# Patient Record
Sex: MALE | Race: WHITE
[De-identification: names, ages, dates, MRNs, and addresses within clinical notes are randomized per-mention and may not be internally consistent; named-entity substitution may affect disease eponyms.]

---

## 2019-12-02 ENCOUNTER — HOSPITAL ENCOUNTER (OUTPATIENT)
Dept: HOSPITAL 25 - ED | Age: 64
Discharge: HOME | End: 2019-12-02
Attending: UROLOGY
Payer: COMMERCIAL

## 2019-12-02 VITALS — SYSTOLIC BLOOD PRESSURE: 138 MMHG | DIASTOLIC BLOOD PRESSURE: 85 MMHG

## 2019-12-02 DIAGNOSIS — Z87.442: ICD-10-CM

## 2019-12-02 DIAGNOSIS — E11.9: ICD-10-CM

## 2019-12-02 DIAGNOSIS — Z79.84: ICD-10-CM

## 2019-12-02 DIAGNOSIS — R10.31: ICD-10-CM

## 2019-12-02 DIAGNOSIS — N13.2: Primary | ICD-10-CM

## 2019-12-02 LAB
ALBUMIN SERPL BCG-MCNC: 4.1 G/DL (ref 3.2–5.2)
ALBUMIN/GLOB SERPL: 1.2 {RATIO} (ref 1–3)
ALP SERPL-CCNC: 75 U/L (ref 34–104)
ALT SERPL W P-5'-P-CCNC: 24 U/L (ref 7–52)
ANION GAP SERPL CALC-SCNC: 10 MMOL/L (ref 2–11)
AST SERPL-CCNC: 21 U/L (ref 13–39)
BASOPHILS # BLD AUTO: 0.1 10^3/UL (ref 0–0.2)
BUN SERPL-MCNC: 26 MG/DL (ref 6–24)
BUN/CREAT SERPL: 19.4 (ref 8–20)
CALCIUM SERPL-MCNC: 9.9 MG/DL (ref 8.6–10.3)
CHLORIDE SERPL-SCNC: 102 MMOL/L (ref 101–111)
EOSINOPHIL # BLD AUTO: 0.1 10^3/UL (ref 0–0.6)
GLOBULIN SER CALC-MCNC: 3.3 G/DL (ref 2–4)
GLUCOSE SERPL-MCNC: 158 MG/DL (ref 70–100)
HCO3 SERPL-SCNC: 21 MMOL/L (ref 22–32)
HCT VFR BLD AUTO: 45 % (ref 42–52)
HGB BLD-MCNC: 15.1 G/DL (ref 14–18)
LYMPHOCYTES # BLD AUTO: 1.5 10^3/UL (ref 1–4.8)
MAGNESIUM SERPL-MCNC: 1.7 MG/DL (ref 1.9–2.7)
MCH RBC QN AUTO: 29 PG (ref 27–31)
MCHC RBC AUTO-ENTMCNC: 34 G/DL (ref 31–36)
MCV RBC AUTO: 87 FL (ref 80–94)
MONOCYTES # BLD AUTO: 0.8 10^3/UL (ref 0–0.8)
NEUTROPHILS # BLD AUTO: 8.2 10^3/UL (ref 1.5–7.7)
NRBC # BLD AUTO: 0 10^3/UL
NRBC BLD QL AUTO: 0
PLATELET # BLD AUTO: 143 10^3/UL (ref 150–450)
POTASSIUM SERPL-SCNC: 4.4 MMOL/L (ref 3.5–5)
PROT SERPL-MCNC: 7.4 G/DL (ref 6.4–8.9)
RBC # BLD AUTO: 5.2 10^6 /UL (ref 4.18–5.48)
SODIUM SERPL-SCNC: 133 MMOL/L (ref 135–145)
WBC # BLD AUTO: 10.6 10^3/UL (ref 3.5–10.8)

## 2019-12-02 PROCEDURE — 80053 COMPREHEN METABOLIC PANEL: CPT

## 2019-12-02 PROCEDURE — 36415 COLL VENOUS BLD VENIPUNCTURE: CPT

## 2019-12-02 PROCEDURE — 74420 UROGRAPHY RTRGR +-KUB: CPT

## 2019-12-02 PROCEDURE — 83735 ASSAY OF MAGNESIUM: CPT

## 2019-12-02 PROCEDURE — 96374 THER/PROPH/DIAG INJ IV PUSH: CPT

## 2019-12-02 PROCEDURE — 76775 US EXAM ABDO BACK WALL LIM: CPT

## 2019-12-02 PROCEDURE — 85025 COMPLETE CBC W/AUTO DIFF WBC: CPT

## 2019-12-02 PROCEDURE — 99283 EMERGENCY DEPT VISIT LOW MDM: CPT

## 2019-12-02 PROCEDURE — 74018 RADEX ABDOMEN 1 VIEW: CPT

## 2019-12-02 PROCEDURE — C1876 STENT, NON-COA/NON-COV W/DEL: HCPCS

## 2019-12-02 PROCEDURE — 86140 C-REACTIVE PROTEIN: CPT

## 2019-12-02 PROCEDURE — 74176 CT ABD & PELVIS W/O CONTRAST: CPT

## 2019-12-02 PROCEDURE — 83690 ASSAY OF LIPASE: CPT

## 2019-12-02 NOTE — OP
CC:  Dr. Lance Huerta *

 

DATE OF OPERATION:  12/02/19 - ROOM #AA-1

 

DATE OF BIRTH:  [birth year]

 

SURGEON:  Ori Aguiar MD

 

ANESTHESIOLOGIST:  Dr. Jennings.

 

ANESTHESIA:  General.

 

PRE-OP DIAGNOSES:

1.  Right renal colic.

2.  8 mm calculus right ureteropelvic junction.

 

POST-OP DIAGNOSES:

1.  Right renal colic.

2.  8 mm calculus right ureteropelvic junction.

 

OPERATIVE PROCEDURE:

1.  Cystoscopy.

2.  Right retrograde pyelography.

3.  Insertion of right ureteral stent (6-Albanian).

 

INDICATION FOR PROCEDURE:  Mr. Siu is a 64-year-old white male who presented 
to the emergency room earlier today with a 3 days' history of recurrent 
episodes of right flank pain.  Noncontrast CT of the abdomen and pelvis showed 
a 7 to 8 mm calculus at the right ureteropelvic junction associated with 
moderate right hydronephrosis.

 

Because of the above finding, the patient was taken to the operating room for 
urgent placement of a right ureteral stent.

 

PATHOLOGY:  At cystoscopy, the penile and bulbar urethrae looked normal.  The 
prostatic urethra was short and open.  Examination of the bladder showed normal 
bladder mucosa.  There were no suspicious bladder lesions seen.  The ureteral 
orifices looked normal.  No calculi or diverticula were noted.

 

At fluoroscopy, no abnormal calcification were noted along the path of the 
ureter or in the area of the right kidney.  In particular, no calcification was 
noted at the level or ureteropelvic junction where the calculus was seen on CT.
  Moderate right hydronephrosis was noted.  A hydronephrotic drip was noted 
from the right kidney after the placement of the ureteral stent.

 

DESCRIPTION OF PROCEDURE:  After successful general anesthesia, the patient was 
placed in the lithotomy position and was prepped and draped for a cystoscopy. 
Cystoscopy was performed.  The bladder was inspected and the above findings 
were noted.

 

A flexible tip guidewire was then introduced into the right orifice and 
positioned in the area of the renal pelvis.  A 5-Albanian open-ended catheter was 
fed on top of the guidewire and positioned in the proximal ureter.  Retrograde 
pyelography was performed.

 

The open-ended catheter was removed over a guidewire.  A 6-Albanian stent was 
then placed with the proximal end coiling in the renal pelvis and the distal 
end coiling inside the bladder.  There was prompt drainage of urine seen around 
and through the ureteral stent.

 

Final fluoroscopy showed good drainage of contrast and again no abnormal 
calcifications noted at the ureteropelvic junction level or inside the kidney.

 

The bladder was then emptied and the cystoscope was removed.  

Rectal examination was then done showing a non-enlarged and nonsuspicious 
prostate.

 

The patient tolerated the procedure well and left the operating room in good 
condition.

 

The plan is to obtain a KUB before the patient's discharge and we will plan on 
the definitive treatment of the stone depending upon the KUB findings.

 

 [id number] #: [id number]

[email or web address]

## 2019-12-02 NOTE — ED
Abdominal Pain/Male





- HPI Summary


HPI Summary: 





64-year-old  male with a significant past medical history of type 2 

diabetes mellitus, known radiopaque kidney stone in the right renal pelvis last 

visualized one year ago reports emergency department today complaining of 8 out 

of 10 sudden onset "stabbing" right flank and right lower abdominal pain which 

is episodic and began 10 PM Friday night while he was getting ready for bed.  

he states one year ago he had a KUB done to monitor a known radio opaque stone 

in his right kidney.  Today he believes this has moved and is causing his 

symptoms.  He endorses feelings of nausea.  She denies tobacco use, alcohol use

, recreational drug use.  He denies fever, chest pain, rashes, vomiting, 

diarrhea, blood in his urine, blood his bowel movements, changes in bowel 

habits.











- History of Current Complaint


Chief Complaint: EDFlankPain


Stated Complaint: FLANK/ABD PAIN PER PT


Time Seen by Provider: 12/02/19 10:00


Hx Obtained From: Patient


Onset/Duration: Sudden Onset, Lasting Hours


Timing: Intermittent


Severity Initially: Moderate


Severity Currently: Moderate


Pain Intensity: 5


Pain Scale Used: 0-10 Numeric


Location: Discrete At: RLQ, Flank


Radiates to: Back


Character: Sharp


Alleviating Factor(s): Nothing


Associated Signs And Symptoms: Positive: Nausea.  Negative: Fever, Chest Pain, 

Back Pain, Constipation, Blood in Stool, Urinary Symptoms, Vomiting, Diarrhea





- Allergies/Home Medications


Allergies/Adverse Reactions: 


 Allergies











Allergy/AdvReac Type Severity Reaction Status Date / Time


 


Penicillins Allergy  See Comment Verified 12/02/19 10:00











Home Medications: 


 Home Medications





Meloxicam(NF) [Mobic(NF)] 15 mg PO DAILY 12/02/19 [History Confirmed 12/02/19]


SitaGLIPtin (NF) [Januvia (NF)] 100 mg PO DAILY 12/02/19 [History Confirmed 12/ 02/19]











PMH/Surg Hx/FS Hx/Imm Hx


Endocrine/Hematology History: Reports: Hx Diabetes - type 2


 History: Reports: Hx Kidney Stones


Infectious Disease History: No


Infectious Disease History: 


   Denies: Traveled Outside the [place] in Last 30 Days





Review of Systems


Constitutional: Negative


Eyes: Negative


ENT: Negative


Cardiovascular: Negative


Respiratory: Negative


Positive: Abdominal Pain, Nausea.  Negative: Vomiting, Diarrhea


Positive: flank pain.  Negative: frequency, hematuria, incontinence, pain


Musculoskeletal: Negative


Skin: Negative


Neurological: Negative


Psychological: Normal


All Other Systems Reviewed And Are Negative: Yes





Physical Exam





- Summary


Physical Exam Summary: 





Inspection of the abdomen reveals no ecchymosis or masses however his abdomen 

is quite distended.  Auscultation reveals normoactive bowel sounds.  Palpation 

reveals mild tenderness to the right lower quadrant.  Negative CVA tenderness.  

No peritoneal signs or rebound tenderness.  No psoas, obturator, Rovsing, 

McBurney's, Rosales's sign.


Triage Information Reviewed: Yes


Vital Signs On Initial Exam: 


 Initial Vitals











Temp Pulse Resp BP Pulse Ox


 


 97.6 F   94   17   163/92   95 


 


 12/02/19 09:57  12/02/19 09:57  12/02/19 09:57  12/02/19 09:57  12/02/19 09:57











Vital Signs Reviewed: Yes


Appearance: Positive: Well-Appearing, No Pain Distress, Well-Nourished, Obese


Skin: Positive: Warm, Skin Color Reflects Adequate Perfusion


Eyes: Positive: EOMI, WILMAR


ENT: Positive: Hearing grossly normal


Respiratory/Lung Sounds: Positive: Clear to Auscultation, Breath Sounds Present


Cardiovascular: Positive: RRR, S1, S2


Abdomen Description: Positive: No Organomegaly, Soft, Distended.  Negative: 

Bruit, CVA Tenderness (R), CVA Tenderness (L), Guarding, McBurney's Point 

Tenderness


Bowel Sounds: Positive: Present


Musculoskeletal: Positive: Strength/ROM Intact


Neurological: Positive: Sensory/Motor Intact, Alert, Oriented to Person Place, 

Time, Normal Gait, Facial Symmetry, Speech Normal


Psychiatric: Positive: Normal


AVPU Assessment: Alert





Procedures





- Sedation


Patient Received Moderate/Deep Sedation with Procedure: No





Diagnostics





- Vital Signs


 Vital Signs











  Temp Pulse Resp BP Pulse Ox


 


 12/02/19 09:57  97.6 F  94  17  163/92  95














- Laboratory


Result Diagrams: 


 12/02/19 10:19





 12/02/19 10:19


Lab Statement: Any lab studies that have been ordered have been reviewed, and 

results considered in the medical decision making process.





Abdominal Pain Male Course/Dx





- Course


Course Of Treatment: Patient was evaluated for right lower quadrant abdominal 

pain and flank pain.  The patient seen and evaluated his vital signs are stable 

he is afebrile.  Labs revealed no leukocytosis with white blood cell count of 

10.6. CRP is elevated at 49.33. There are no significant electrolyte 

abnormalities.  His BUN and creatinine are slightly elevated at 26 and 1.34 

however this is his baseline. KUB x-ray shows there are multiple small 

calcifications which projected over the pelvis.  The majority of these would be 

consistent with phleboliths although a calculus in the distal ureter cannot be 

ruled out.  Renal ultrasound completed shows right hydronephrosis with absence 

of right ureteral jet  There is an exophytic cyst of the midpole of the right 

kidney measuring 1 x 0.4 x 1.4 cm.  There is no appreciable nephrolithiasis. 

KUB shows multiple small calcifications which protract over the pelvis.  The 

majority of these would be consistent with phleboliths although a calculus in 

the distal ureter cannot be ruled out.  CT scan without contrast was performed 

which showed a 7 mm stone in the right proximal ureter. Dr. Aguiar was 

consulted at 1455 about these findings and agreed to take the patient to the 

operating room for placement of a right ureteral stent due to an obstructing 

right renal calculi in the proximal ureter.  Patient was notified of the 

results and agreed with this plan.  Patient has been nothing by mouth since 7 

AM.





- Diagnoses


Differential Diagnosis/HQI/PQRI: Constipation, Diverticulitis, Renal Colic, 

Ureteral Stone, Urinary Tract Infection


Provider Diagnoses: 


 Renal calculus, right








- Provider Notifications


Discussed Care Of Patient With: Ori Aguiar - CT results were discussed 

regarding 7 mm obstructing right renal calculi.  He stated he will take the 

patient to the operating room this evening for placement of a right ureteral 

stent.


Time Discussed With Above Provider: 15:00


Admit/Transition Orders Completed By ED Provider: No





Discharge ED





- Sign-Out/Discharge


Documenting (check all that apply): Patient Departure





- Discharge Plan


Condition: Stable


Disposition: HOME


Referrals: 


Lance Huerta MD [Primary Care Provider] - 





- Billing Disposition and Condition


Condition: STABLE


Disposition: Home

## 2019-12-02 NOTE — HP
CC:  Dr. Lance Huerta *

 

HISTORY AND PHYSICAL:

 

DATE OF ADMISSION/SURGERY:  12/02/19 - ROOM #AA-01

 

HISTORY OF PRESENT ILLNESS:  Mr. Siu is a 64-year-old white male who is 
admitted with symptoms of right renal colic secondary to a 7 to 8 mm calculus 
at the right ureteropelvic junction, for cystoscopy and insertion of right 
ureteral stent.

 

Mr. Siu is a known stone former and about 5 years ago he required a left 
ureteroscopy and laser lithotripsy for an obstructing left ureteral calculus at 
the [facility].  He was noted at that time to have a right renal calculus
; however, the calculus was not large enough for treatment and he was just 
observed.

 

The patient was doing fine until 3 days ago where he was started having mild 
episodes of right flank pain.  The pain became severe early this morning and he 
presented to the emergency room for management.  There was no associated fever 
or chills.  He did not have gross hematuria.

 

Work-up in the emergency room showed him to be afebrile.  Renal ultrasound 
showed moderate right hydronephrosis, no calculi were seen and there were no 
ureteral jets noted from the right ureteral orifice.  KUB showed calcifications 
in the pelvis more consistent with phleboliths, but there were no 
calcifications noted along the path of the proximal right ureter.

 

The patient then had a noncontrast CT of the abdomen and pelvis, which showed a 
7 to 8 mm calculus at the right uretero-pelvic junction associated with 
moderate hydronephrosis.  No other abnormal calcifications were noted.

 

Because of that finding, the patient is admitted for the above procedure.

 

PAST MEDICAL HISTORY AND SYSTEM REVIEW:  The patient is diabetic, well 
controlled on Januvia 100 mg daily, Metformin 1,000 mg twice a day, and 
Glipizide 10 mg twice a day .  He is on Simvastatin 40 mg per day.He has a 
chronic left shoulder pain and he is on meloxicam 15 mg daily.  He denies any 
cardiac or pulmonary diseases or symptoms. He has good exercise tolerance and 
his work is physically demanding.

 

FAMILY HISTORY:  Relevant for his father who has prostate cancer and for his 
sister who has history of renal calculi.

 

PERSONAL HISTORY:  He is a nonsmoker.  No history of any recreational drug use.
  No history of mental diseases.

 

                               PHYSICAL EXAMINATION

 

GENERAL:  He is a moderately overweight white male, who when I examined him was 
not in acute pain.

 

VITAL SIGNS:  Blood pressure 148/80, pulse of 85, oxygen saturation 95% on room 
air, temperature is 37 celsius, and weight is 207 pounds.

 

LUNGS:  Clear.

 

HEART:  Regular and rhythmic, no murmurs.

 

ABDOMEN:  Soft.  No masses, no tenderness.  There is mild right CVA tenderness.

 

EXTERNAL GENITALIA:  Done in the operating room. Circumcised, no penile 
lesions..

 

RECTAL EXAM:  Done in the operating room. Non enlarged, benign feeling prostate.

 

 IMPRESSION:

1.  Right renal colic secondary to 7 to 8 mm calculus at the right 
ureteropelvic junction, seen on CT but not on KUB..

2.  Diabetes mellitus, well controlled.

 

PLAN:  Plan is for cystoscopy and insertion of right ureteral stent in 
preparation for definitive treatment of the stone.

 

A KUB will be obtained postoperatively and if the calculus can be visualized, 
then the patient will be a candidate for shockwave lithotripsy of the right 
renal calculus followed by removal of right ureteral stent.

 

I discussed the above plans in detail with the patient.  All his questions were 
answered.

 

 

 

[id number]/CPS #: [id number]

MAYTE

## 2019-12-03 NOTE — XMS REPORT
Continuity of Care Document (CCD)

 Created on:2019



Patient:Josef Siu

Sex:Male

:1955

External Reference #:[id number]





Demographics







 Address  [address] [place] [address]

 

 Mobile Phone  [phone]

 

 Preferred Language  en

 

 Marital Status  Not  or 

 

 Jehovah's witness Affiliation  Unknown

 

 Race  White

 

 Ethnic Group  Not  or 









Author







 Name  Bambi Gabriel PA-C

 

 Address  [address] [place] [address]









Care Team Providers







 Name  Role  Phone

 

 Lance Huerta MD  Care Team Information   [phone]









Problems







 Active Problems  Provider  Date

 

 Kidney stone  Bambi Gabriel PA-C  Onset: 2019

 

 Type 2 diabetes mellitus  Bambi Gabriel PA-C  Onset: 2019

 

 Hemorrhage of rectum and anus  Bambi Gabriel PA-C  Onset: 10/28/2019







Social History







 Type  Date  Description  Comments

 

 Birth Sex    Unknown  

 

 Tobacco Use  Start: Unknown  Patient has never smoked  

 

 Smoking Status  Reviewed: 10/28/19  Patient has never smoked  







Allergies, Adverse Reactions, Alerts







 Active Allergies  Reaction  Severity  Comments  Date

 

 Penicillin        10/28/2019







Medications







 Active Medications  SIG  Qnty  Indications  Ordering Provider  Date

 

 Simvastatin  Take One Tablet      Unknown  



          40mg Tablets  By Mouth Every        



   Day        

 

 Januvia  1 by mouth every      Unknown  



      100mg Tablets  day        



           

 

 Jardiance  Daily      Unknown  



        25mg Tablets          



           

 

 Glipizide XL  2 by mouth bid      Unknown  



           5mg Tablets          



 ER 24HR          



           

 

 Metformin HCL  bid      Unknown  



            1000mg          



 Tablets          



           

 

 Meloxicam  1 by mouth every      Unknown  



        15mg Tablets  day        



           







Immunizations







 Description

 

 No Information Available







Vital Signs







 Date  Vital  Result  Comment

 

 10/28/2019  8:36am  Height  70 inches  5'10"









 Weight  212.00 lb  

 

 BP Systolic  138 mmHg  

 

 BP Diastolic  83 mmHg  

 

 Heart Rate  70 /min  

 

 BMI (Body Mass Index)  30.4 kg/m2  









 2016 10:02am  Height  70 inches  5'10"









 Weight  222.00 lb  

 

 BMI (Body Mass Index)  31.9 kg/m2  







Results







 Description

 

 No Information Available







Procedures







 Description

 

 No Information Available







Medical Devices







 Description

 

 No Information Available







Encounters







 Description

 

 No Information Available







Assessments







 Date  Code  Description  Provider

 

 10/28/2019  K62.5  Hemorrhage of anus and rectum  Bambi Gabriel PA-C







Plan of Treatment

10/28/2019 - MARIANO Gordillo-CK62.5 Hemorrhage of anus and rectumNew 
Labs:Stool Occult Blood, Screen, Ordered: 10/28/19



Functional Status







 Description

 

 No Information Available







Mental Status







 Description

 

 No Information Available







Referrals







 Description

 

 No Information Available

## 2019-12-03 NOTE — XMS REPORT
Continuity of Care Document (CCD)

 Created on:2019



Patient:Nicanor Johnson

Sex:Male

:1955

External Reference #:[id number]





Demographics







 Address  [address] [place] [address]

 

 Mobile Phone  [phone]

 

 Preferred Language  en

 

 Marital Status  Not  or 

 

 Episcopalian Affiliation  Unknown

 

 Race  White

 

 Ethnic Group  Not  or 









Author







 Name  Bambi Gabriel PA-C

 

 Address  [address] [place] [address]









Care Team Providers







 Name  Role  Phone

 

 Lance Huerta MD  Care Team Information   [phone]









Problems







 Active Problems  Provider  Date

 

 Kidney stone  Bambi Gabriel PA-C  Onset: 2019

 

 Type 2 diabetes mellitus  Bambi Gabriel PA-C  Onset: 2019

 

 Hemorrhage of rectum and anus  Bambi Gabriel PA-C  Onset: 10/28/2019







Social History







 Type  Date  Description  Comments

 

 Birth Sex    Unknown  

 

 Tobacco Use  Start: Unknown  Patient has never smoked  

 

 Smoking Status  Reviewed: 10/28/19  Patient has never smoked  







Allergies, Adverse Reactions, Alerts







 Active Allergies  Reaction  Severity  Comments  Date

 

 Penicillin        10/28/2019







Medications







 Active Medications  SIG  Qnty  Indications  Ordering Provider  Date

 

 Simvastatin  Take One Tablet      Unknown  



          40mg Tablets  By Mouth Every        



   Day        

 

 Januvia  1 by mouth every      Unknown  



      100mg Tablets  day        



           

 

 Jardiance  Daily      Unknown  



        25mg Tablets          



           

 

 Glipizide XL  2 by mouth bid      Unknown  



           5mg Tablets          



 ER 24HR          



           

 

 Metformin HCL  bid      Unknown  



            1000mg          



 Tablets          



           

 

 Meloxicam  1 by mouth every      Unknown  



        15mg Tablets  day        



           







Immunizations







 Description

 

 No Information Available







Vital Signs







 Date  Vital  Result  Comment

 

 10/28/2019  8:36am  Height  70 inches  5'10"









 Weight  212.00 lb  

 

 BP Systolic  138 mmHg  

 

 BP Diastolic  83 mmHg  

 

 Heart Rate  70 /min  

 

 BMI (Body Mass Index)  30.4 kg/m2  









 2016 10:02am  Height  70 inches  5'10"









 Weight  222.00 lb  

 

 BMI (Body Mass Index)  31.9 kg/m2  







Results







 Test  Acquired Date  Facility  Test  Result  H/L  Range  Note

 

 Stool Occult  2019  [facility]  Stool Occult  SEE RESULT      1, 2



 Blood, Screen      Blood, Screen  BELOW      









 1  [id number]

 

 2  SEE RESULT BELOW



   -----------------------------------------------------------------------------
---------------



   Name:  NICANOR JOHNSON                     : 1955    Attend Dr: Bambi QUINTANA



   Acct:  [id number]  Unit: [id number]  AGE: 64            Location:  [facility]



   Re19                        SEX: M             Status:    REG REF



   -----------------------------------------------------------------------------
---------------



   



   SPEC: [id number]       OSCAR:     [facility] DR: Bambi QUINTANA



   REQ:  [id number]            RECD:  



   STATUS:COMP



   _



   SOURCE: STOOL          SPDESC:



   ORDERED:  Occult Bl, Scn



   COMMENTS: [id number]



   



   -----------------------------------------------------------------------------
---------------



   Procedure                         Result                         Reported   
        Site



   -----------------------------------------------------------------------------
---------------



   



   Stool Occult Blood (1)  Final                                    19-
1410      ML



   Stool Occult Blood          Negative



   



   Collection Date (1)         19



   



   Stool Occult Blood (2)  Final                                    19-
1410      ML



   Stool Occult Blood          Negative



   



   Collection Date (2)         19



   



   Stool Occult Blood (3)  Final                                    19-
1410      ML



   Stool Occult Blood          Negative



   



   Collection Date (3)         19



   



   -----------------------------------------------------------------------------
---------------



   * [facility] - Main Lab



   .



   



   



   



   



   



   



   



   



   



   



   



   



   ** END OF REPORT **



   



   DEPARTMENT OF PATHOLOGY,  [address] [place] [address]



   Phone # [phone]      Fax #[phone]



   Sva Helms M.D. Director     [facility] # [id number]







Procedures







 Description

 

 No Information Available







Medical Devices







 Description

 

 No Information Available







Encounters







 Type  Date  Location  Provider  Dx  Diagnosis

 

 Office Visit  10/28/2019  Gastroenterology  Bambi RUELAS62.5  Hemorrhage of



   8:30a  [facility]  KYLIE Gabriel    anus and rectum







Assessments







 Date  Code  Description  Provider

 

 10/28/2019  SURAJ62.5  Hemorrhage of anus and rectum  Bambi Gabriel PA-C







Plan of Treatment

No Information Available



Functional Status







 Description

 

 No Information Available







Mental Status







 Description

 

 No Information Available







Referrals







 Description

 

 No Information Available

## 2019-12-16 NOTE — HP
INTERVAL HISTORY AND PHYSICAL NOTE:

 

DATE OF PLANNED ADMISSION AND SURGERY:  12/23/19

 

HISTORY OF PRESENT ILLNESS:  Please refer to my detailed history and physical 
for this patient's admission dated 12/02/19.

 

In brief, Mr. Siu presented to the emergency room on 12/02/2019 with 
symptoms of right renal colic secondary to 7 to 8 mm calculus at the right 
ureteropelvic junction.  He had urgent placement of a right ureteral stent.  
Post-operatively, a KUB was obtained and showed the right ureteral stent in 
good position and a faint radiopaque calculus noted in the lower pole ann of 
the right kidney.  This was confirmed on office renal ultrasound.

 

There has not been any significant changes in his medical condition.  He is 
diabetic, on treatment.  He has chronic shoulder pain.  He has hyperlipidemia.  
He denies any cardiac or pulmonary diseases or symptoms.  He is a nonsmoker.  
He reports being allergic to penicillin.

 

Preoperative physical examination was all within normal.  He had no CVA 
tenderness. 

His urine analysis showed microscopic hematuria from the stent, positive for 
glucose, no infection was negative otherwise.

 

IMPRESSION:  Right renal calculus, 8 mm, status post placement right ureteral 
stent.

 

PLAN:  Plan is to obtain a preoperative KUB after the patient has been on 
laxatives for 2 days.  If the right renal calculus can be adequately seen on 
KUB and on fluoroscopy, he will undergo shock wave lithotripsy of the right 
renal calculus, and possible cystoscopy and removal of the right ureteral stent 
if good fragmentation of the stone is achieved.

 

I discussed the above plans in detail with the patient.  Some of the potential 
complications including hematuria, small incidence of renal hematoma, 
postoperative right renal colic.  All his questions were answered.

 

[id number]/CPS #: [id number]

MAYTE

## 2019-12-23 ENCOUNTER — HOSPITAL ENCOUNTER (OUTPATIENT)
Dept: HOSPITAL 25 - OR | Age: 64
Discharge: HOME | End: 2019-12-23
Attending: UROLOGY
Payer: COMMERCIAL

## 2019-12-23 VITALS — SYSTOLIC BLOOD PRESSURE: 124 MMHG | DIASTOLIC BLOOD PRESSURE: 75 MMHG

## 2019-12-23 DIAGNOSIS — Z79.84: ICD-10-CM

## 2019-12-23 DIAGNOSIS — E78.5: ICD-10-CM

## 2019-12-23 DIAGNOSIS — Z88.0: ICD-10-CM

## 2019-12-23 DIAGNOSIS — E11.9: ICD-10-CM

## 2019-12-23 DIAGNOSIS — N20.0: Primary | ICD-10-CM

## 2019-12-23 PROCEDURE — 74018 RADEX ABDOMEN 1 VIEW: CPT

## 2019-12-23 NOTE — OP
CC:  Dr. Huerta*

 

OPERATIVE REPORT:

 

DATE OF OPERATION:  12/23/19 - SDS

 

DATE OF BIRTH:  [birth year]

 

SURGEON:  Ori Aguiar MD.

 

ANESTHESIOLOGIST:  Dr. Narayan Porter.

 

ANESTHESIA:  General.

 

PRE-OP DIAGNOSES:

1.  Right renal calculus.

2.  Status post insertion of right ureteral stent.

 

POST-OP DIAGNOSIS:  Proximal right ureteral calculus.

 

OPERATIVE PROCEDURES:  Shockwave lithotripsy of proximal right ureteral 
calculus (8 mm).

 

INDICATION FOR PROCEDURE:  Mr. Siu is a 64-year-old white male who had 
urgent placement of a right ureteral stent for an obstructing 8 mm calculus at 
the right ureteropelvic junction.  Postoperative KUB showed that the calculus 
had migrated into the lower pole calyx of the right kidney.  He was admitted 
for shockwave lithotripsy.

 

PATHOLOGY:  Preoperative KUB and fluoroscopy both showed the calculus to have 
migrated back into the proximal ureter just distal to the ureteropelvic 
junction.  The calculus was rather faint in opacification and measured about 8 
mm in size.  The stent was in good position.

 

DESCRIPTION OF PROCEDURE:  After successful general anesthesia, the patient was 
placed on the shockwave lithotripsy table in the supine position.  The proximal 
right ureteral calculus was visualized in both the PA and the oblique x-ray 
views, and the position of the generator and of the patient were adjusted to 
have the stone in the focus of the shock waves.

 

A total of 1,500 shocks were then delivered at the rate of 60 shocks per 
minute. The proper positioning and fragmentation of the stone were monitored 
periodically. The stone fragmented easily.  At the end of the procedure, it 
could not be seen anymore on fluoroscopy.

 

With the expected ureteral wall edema with the shockwave lithotripsy, it was 
decided not to remove the stent at this time.

 

The patient tolerated the procedure well and left the operating room in good 
condition.

 

A postoperative KUB will be obtained today.  The patient will be seen in the 
office next week for renal ultrasound and decision will be made regarding the 
stent removal.

 

 [id number] #: [id number]

MTDD